# Patient Record
Sex: FEMALE | Race: ASIAN | NOT HISPANIC OR LATINO
[De-identification: names, ages, dates, MRNs, and addresses within clinical notes are randomized per-mention and may not be internally consistent; named-entity substitution may affect disease eponyms.]

---

## 2024-04-30 PROBLEM — Z00.00 ENCOUNTER FOR PREVENTIVE HEALTH EXAMINATION: Status: ACTIVE | Noted: 2024-04-30

## 2024-05-01 ENCOUNTER — NON-APPOINTMENT (OUTPATIENT)
Age: 49
End: 2024-05-01

## 2024-05-01 ENCOUNTER — APPOINTMENT (OUTPATIENT)
Dept: CARDIOLOGY | Facility: CLINIC | Age: 49
End: 2024-05-01
Payer: COMMERCIAL

## 2024-05-01 VITALS
OXYGEN SATURATION: 100 % | SYSTOLIC BLOOD PRESSURE: 149 MMHG | RESPIRATION RATE: 18 BRPM | HEART RATE: 64 BPM | WEIGHT: 122 LBS | BODY MASS INDEX: 20.08 KG/M2 | DIASTOLIC BLOOD PRESSURE: 89 MMHG | HEIGHT: 65.35 IN

## 2024-05-01 PROCEDURE — 93000 ELECTROCARDIOGRAM COMPLETE: CPT

## 2024-05-01 PROCEDURE — 99204 OFFICE O/P NEW MOD 45 MIN: CPT | Mod: 25

## 2024-05-02 NOTE — ASSESSMENT
[FreeTextEntry1] : 48 year old F with HTN (diagnosed 2 years ago) who presents for cardiac evaluation.  Pt reports she tries to exercise every other day for approximately 1 hour but notices she sometimes feel a little tightness in the back and her chest. She denies SOB, palpitations, dizziness, LOC, orthopnea, PND, or LE edema. She tries to eat low salt diet given her hypertension diagnosis.  She is on 1 med for HTN.  1) Chest/back discomfort 2) HTN - EKG showed sinus rhythm with left atrial enlargement - I advised pt to undergo treadmill stress to r/o ischemia - Given her history of HTN, chest discomfort, and LAE on EKG, I advised pt to undergo TTE to r/o structural heart disease  3) Follow-up, pending above

## 2024-05-02 NOTE — HISTORY OF PRESENT ILLNESS
[FreeTextEntry1] : 48 year old F with HTN (diagnosed 2 years ago) who presents for cardiac evaluation.  Pt reports she tries to exercise every other day for approximately 1 hour but notices she sometimes feel a little tightness in the back and her chest. She denies SOB, palpitations, dizziness, LOC, orthopnea, PND, or LE edema. She tries to eat low salt diet given her hypertension diagnosis.  She is on losartan 50mg daily

## 2024-05-08 ENCOUNTER — APPOINTMENT (OUTPATIENT)
Dept: CARDIOLOGY | Facility: CLINIC | Age: 49
End: 2024-05-08

## 2024-05-08 ENCOUNTER — APPOINTMENT (OUTPATIENT)
Dept: CARDIOLOGY | Facility: CLINIC | Age: 49
End: 2024-05-08
Payer: COMMERCIAL

## 2024-05-08 VITALS
BODY MASS INDEX: 20.08 KG/M2 | RESPIRATION RATE: 18 BRPM | DIASTOLIC BLOOD PRESSURE: 89 MMHG | SYSTOLIC BLOOD PRESSURE: 135 MMHG | OXYGEN SATURATION: 100 % | HEART RATE: 70 BPM | WEIGHT: 122 LBS

## 2024-05-08 DIAGNOSIS — R07.89 OTHER CHEST PAIN: ICD-10-CM

## 2024-05-08 DIAGNOSIS — I10 ESSENTIAL (PRIMARY) HYPERTENSION: ICD-10-CM

## 2024-05-08 PROCEDURE — 99214 OFFICE O/P EST MOD 30 MIN: CPT

## 2024-05-08 PROCEDURE — 93306 TTE W/DOPPLER COMPLETE: CPT

## 2024-05-08 PROCEDURE — G2211 COMPLEX E/M VISIT ADD ON: CPT | Mod: NC,1L

## 2024-05-08 NOTE — PHYSICAL EXAM
[Well Developed] : well developed [Well Nourished] : well nourished [No Acute Distress] : no acute distress [Normal S1, S2] : normal S1, S2 [No Murmur] : no murmur [No Rub] : no rub [No Gallop] : no gallop [Clear Lung Fields] : clear lung fields [Good Air Entry] : good air entry [No Respiratory Distress] : no respiratory distress  [No Edema] : no edema [No Cyanosis] : no cyanosis [No Clubbing] : no clubbing [Normal Conjunctiva] : normal conjunctiva [Normal Venous Pressure] : normal venous pressure [No Carotid Bruit] : no carotid bruit [Soft] : abdomen soft [Non Tender] : non-tender [No Masses/organomegaly] : no masses/organomegaly [Normal Bowel Sounds] : normal bowel sounds [Normal Gait] : normal gait [No Varicosities] : no varicosities [No Rash] : no rash [No Skin Lesions] : no skin lesions [Moves all extremities] : moves all extremities [No Focal Deficits] : no focal deficits [Normal Speech] : normal speech [Alert and Oriented] : alert and oriented [Normal memory] : normal memory

## 2024-05-08 NOTE — REVIEW OF SYSTEMS
[Chest Discomfort] : chest discomfort [SOB] : shortness of breath [Nausea] : nausea [Negative] : Heme/Lymph

## 2024-05-09 LAB
ALBUMIN SERPL ELPH-MCNC: 4.9 G/DL
ALP BLD-CCNC: 91 U/L
ALT SERPL-CCNC: 15 U/L
AMYLASE/CREAT SERPL: 96 U/L
ANION GAP SERPL CALC-SCNC: 16 MMOL/L
AST SERPL-CCNC: 17 U/L
BASOPHILS # BLD AUTO: 0.04 K/UL
BASOPHILS NFR BLD AUTO: 0.8 %
BILIRUB SERPL-MCNC: 0.4 MG/DL
BUN SERPL-MCNC: 16 MG/DL
CALCIUM SERPL-MCNC: 9.4 MG/DL
CHLORIDE SERPL-SCNC: 102 MMOL/L
CHOLEST SERPL-MCNC: 228 MG/DL
CO2 SERPL-SCNC: 22 MMOL/L
CREAT SERPL-MCNC: 0.72 MG/DL
DEPRECATED D DIMER PPP IA-ACNC: <150 NG/ML DDU
EGFR: 102 ML/MIN/1.73M2
EOSINOPHIL # BLD AUTO: 0.17 K/UL
EOSINOPHIL NFR BLD AUTO: 3.5 %
ESTIMATED AVERAGE GLUCOSE: 111 MG/DL
GLUCOSE SERPL-MCNC: 91 MG/DL
HBA1C MFR BLD HPLC: 5.5 %
HCT VFR BLD CALC: 44.9 %
HDLC SERPL-MCNC: 67 MG/DL
HGB BLD-MCNC: 14.5 G/DL
IMM GRANULOCYTES NFR BLD AUTO: 0.2 %
LDLC SERPL CALC-MCNC: 110 MG/DL
LPL SERPL-CCNC: 41 U/L
LYMPHOCYTES # BLD AUTO: 2.08 K/UL
LYMPHOCYTES NFR BLD AUTO: 42.9 %
MAN DIFF?: NORMAL
MCHC RBC-ENTMCNC: 30 PG
MCHC RBC-ENTMCNC: 32.3 GM/DL
MCV RBC AUTO: 92.8 FL
MONOCYTES # BLD AUTO: 0.34 K/UL
MONOCYTES NFR BLD AUTO: 7 %
NEUTROPHILS # BLD AUTO: 2.21 K/UL
NEUTROPHILS NFR BLD AUTO: 45.6 %
NONHDLC SERPL-MCNC: 161 MG/DL
NT-PROBNP SERPL-MCNC: <36 PG/ML
PLATELET # BLD AUTO: 270 K/UL
POTASSIUM SERPL-SCNC: 4.3 MMOL/L
PROT SERPL-MCNC: 8 G/DL
RBC # BLD: 4.84 M/UL
RBC # FLD: 13.3 %
SODIUM SERPL-SCNC: 140 MMOL/L
TRIGL SERPL-MCNC: 296 MG/DL
TROPONIN-T, HIGH SENSITIVITY: 7 NG/L
TSH SERPL-ACNC: 1.64 UIU/ML
WBC # FLD AUTO: 4.85 K/UL

## 2024-05-09 NOTE — HISTORY OF PRESENT ILLNESS
[FreeTextEntry1] : Pt presents today due to persistent chest discomfort and back pain. The chest discomfort is associated with mild feelings of dyspnea. She also feels fatigued and nauseous/dizzy. She feels like she has to sleep all day. No orthopnea, PND, or LE edema. No palpitations or LOC. No f/c or recent travel. She feels like these symptoms started after COVID vaccines a couple years ago.   5/1/24: Pt reports she tries to exercise every other day for approximately 1 hour but notices she sometimes feel a little tightness in the back and her chest. She denies SOB, palpitations, dizziness, LOC, orthopnea, PND, or LE edema. She tries to eat low salt diet given her hypertension diagnosis.

## 2024-05-09 NOTE — ASSESSMENT
[FreeTextEntry1] : 48 year old F with HTN (diagnosed 2 years ago) who presents for f/u.  Pt presents today due to persistent chest discomfort and back pain. The chest discomfort is associated with mild feelings of dyspnea. She also feels fatigued and nauseous/dizzy. She feels like she has to sleep all day. No orthopnea, PND, or LE edema. No palpitations or LOC. No f/c or recent travel. She feels like these symptoms started after COVID vaccines a couple years ago.   1) Chest/back discomfort, mid-sternal, with nausea 2) HTN - Pt is euvolemic on exam. EKG is non-ischemic. She is not tachycardic or hypoxic. - I advised pt to undergo treadmill stress to r/o ischemia. Pt wants to defer this due to nausea - I advised pt to undergo TTE 5/8/24 which showed normal LV/RV function without significant valvular disease - We discussed she should consider alternative etiology to her symptoms (?GI) - D-Dimer, proBNP, troponin, amylase/lipase were negative.  - LDL elevated to 110 with triglyceride 296, recommend dietary/lifestyle changes for now  3) Follow-up, pending treadmill stress test

## 2024-05-09 NOTE — REASON FOR VISIT
[Symptom and Test Evaluation] : symptom and test evaluation [FreeTextEntry1] : 48 year old F with HTN (diagnosed 2 years ago) who presents for f/u.  Meds: losartan 50

## 2024-05-22 ENCOUNTER — APPOINTMENT (OUTPATIENT)
Dept: CARDIOLOGY | Facility: CLINIC | Age: 49
End: 2024-05-22
Payer: COMMERCIAL

## 2024-05-22 PROCEDURE — 93015 CV STRESS TEST SUPVJ I&R: CPT
